# Patient Record
Sex: FEMALE | Race: WHITE | NOT HISPANIC OR LATINO | URBAN - METROPOLITAN AREA
[De-identification: names, ages, dates, MRNs, and addresses within clinical notes are randomized per-mention and may not be internally consistent; named-entity substitution may affect disease eponyms.]

---

## 2018-01-12 ENCOUNTER — OFFICE (OUTPATIENT)
Dept: URBAN - METROPOLITAN AREA CLINIC 33 | Facility: CLINIC | Age: 73
End: 2018-01-12
Payer: COMMERCIAL

## 2018-01-12 VITALS
HEIGHT: 67 IN | DIASTOLIC BLOOD PRESSURE: 92 MMHG | TEMPERATURE: 97 F | SYSTOLIC BLOOD PRESSURE: 120 MMHG | WEIGHT: 188 LBS | HEART RATE: 85 BPM

## 2018-01-12 DIAGNOSIS — R13.10 DYSPHAGIA, UNSPECIFIED: ICD-10-CM

## 2018-01-12 DIAGNOSIS — K21.9 GASTRO-ESOPHAGEAL REFLUX DISEASE WITHOUT ESOPHAGITIS: ICD-10-CM

## 2018-01-12 DIAGNOSIS — K59.00 CONSTIPATION, UNSPECIFIED: ICD-10-CM

## 2018-01-12 PROCEDURE — 99214 OFFICE O/P EST MOD 30 MIN: CPT

## 2018-01-12 RX ORDER — PANTOPRAZOLE SODIUM 40 MG/1
TABLET, DELAYED RELEASE ORAL
Qty: 39 | Refills: 11 | Status: COMPLETED
Start: 2018-01-12 | End: 2019-08-08

## 2018-01-12 NOTE — SERVICEHPINOTES
71 yo female presents with difficulty swallowing and a lot of heartburn. She notes tightness and pressure in lower substernal area when she eats or drinks. She has been on pantoprazole 40 mg for many years - was getting through her pulmonologist. She has been off of it since December and is now having symptoms. She had heartburn and dysphagia in past and had EGD here in 2016 with empiric dilation. EGD was normal other than irregular z-line - bx showed inflammation, negative for Mirza's. She reports high stress levels and is tearful during visit. Says her  is having health problems as well. No abdominal pain, N/V, black stools. She does struggle with chronic constipation. Uses Miralax about 4x/week but states it doesn't work very well. She has pacemaker. Has aortic regurgitation - echo from Oct 2017 reports moderate regurg and enlarged ascending aorta.

## 2018-07-13 ENCOUNTER — OFFICE (OUTPATIENT)
Dept: URBAN - METROPOLITAN AREA CLINIC 33 | Facility: CLINIC | Age: 73
End: 2018-07-13

## 2018-07-13 VITALS
SYSTOLIC BLOOD PRESSURE: 122 MMHG | HEIGHT: 67 IN | DIASTOLIC BLOOD PRESSURE: 83 MMHG | WEIGHT: 189 LBS | TEMPERATURE: 97.9 F | HEART RATE: 67 BPM

## 2018-07-13 DIAGNOSIS — R13.10 DYSPHAGIA, UNSPECIFIED: ICD-10-CM

## 2018-07-13 DIAGNOSIS — K59.09 OTHER CONSTIPATION: ICD-10-CM

## 2018-07-13 DIAGNOSIS — K21.9 GASTRO-ESOPHAGEAL REFLUX DISEASE WITHOUT ESOPHAGITIS: ICD-10-CM

## 2018-07-13 PROCEDURE — 99214 OFFICE O/P EST MOD 30 MIN: CPT

## 2018-07-13 RX ORDER — OMEPRAZOLE 40 MG/1
CAPSULE, DELAYED RELEASE ORAL
Qty: 30 | Refills: 12 | Status: COMPLETED
End: 2020-06-09

## 2018-07-13 NOTE — SERVICEHPINOTES
73 yo female presents for f/u GERD and dysphagia. She was here in January and had been having symptoms since being off her PPI. EGD was planned but she didn't end up getting this done. She was doing fairly well back on her pantoprazole but as of a few weeks ago has been having more acid reflux symptoms. Tends to be somewhat worse at night. Has started methylated/active B6-9-12 supplement (Metanx) for her neuropathy recently and wasn't sure if that might be causing worse GERD. She does also have seasonal allergies which have seemed worse recently as well - causes hoarse voice occasionally. She has a pulmonologist she sees. Still has occasional dysphagia, also had in past and had EGD here in 2016 with empiric dilation. EGD was normal other than irregular z-line - bx showed inflammation, negative for Mirza's. No abdominal pain, N/V, black stools. She does struggle with chronic constipation but this is currently better-controlled on Miralax 1-2x/day. She tried Trulance earlier this year and it worked well but is not covered by her insurance. Has not tried Amitiza or Linzess. Can have occasional bouts of looser stools.  She has pacemaker. Has aortic regurgitation - echo from Oct 2017 reports moderate regurg and enlarged ascending aorta. Cleared for EGD by cardiology earlier this year.

## 2018-09-06 ENCOUNTER — ON CAMPUS - OUTPATIENT (OUTPATIENT)
Dept: URBAN - METROPOLITAN AREA HOSPITAL 14 | Facility: HOSPITAL | Age: 73
End: 2018-09-06
Payer: COMMERCIAL

## 2018-09-06 DIAGNOSIS — K21.9 GASTRO-ESOPHAGEAL REFLUX DISEASE WITHOUT ESOPHAGITIS: ICD-10-CM

## 2018-09-06 DIAGNOSIS — R13.10 DYSPHAGIA, UNSPECIFIED: ICD-10-CM

## 2018-09-06 DIAGNOSIS — K31.7 POLYP OF STOMACH AND DUODENUM: ICD-10-CM

## 2018-09-06 PROCEDURE — 43239 EGD BIOPSY SINGLE/MULTIPLE: CPT

## 2018-09-06 PROCEDURE — 43450 DILATE ESOPHAGUS 1/MULT PASS: CPT

## 2019-08-08 ENCOUNTER — OFFICE (OUTPATIENT)
Dept: URBAN - METROPOLITAN AREA CLINIC 33 | Facility: CLINIC | Age: 74
End: 2019-08-08
Payer: COMMERCIAL

## 2019-08-08 VITALS
SYSTOLIC BLOOD PRESSURE: 130 MMHG | WEIGHT: 181 LBS | DIASTOLIC BLOOD PRESSURE: 73 MMHG | HEART RATE: 56 BPM | TEMPERATURE: 97 F | HEIGHT: 67 IN

## 2019-08-08 DIAGNOSIS — R10.11 RIGHT UPPER QUADRANT PAIN: ICD-10-CM

## 2019-08-08 DIAGNOSIS — R07.89 OTHER CHEST PAIN: ICD-10-CM

## 2019-08-08 DIAGNOSIS — K59.00 CONSTIPATION, UNSPECIFIED: ICD-10-CM

## 2019-08-08 DIAGNOSIS — K21.9 GASTRO-ESOPHAGEAL REFLUX DISEASE WITHOUT ESOPHAGITIS: ICD-10-CM

## 2019-08-08 PROCEDURE — 99214 OFFICE O/P EST MOD 30 MIN: CPT

## 2019-08-08 NOTE — SERVICEHPINOTES
SALENA CALDERÓN   is a   73  female who present with heartburn. As long as takes Omeprazole 40 mg, the acid reflux is controlled. Trouble swallowing on occasions with solids.  BRHad GI virus about 6 months ago, vomited black emesis for one day and had diarrhea. Think it was due to eating spoiled egg. BRDenies further nausea or vomiting since then. Occasional RUQ pain. Reports having chest pain, cardiac work up was stable. Seen by psychiatrists and prescribed clonazepam since 10 days ago. The chest pain seem to be improving. + Stress. Has been experiencing shakiness, thought to be due to low sugar. Takes protein drinks in between meals which seem to help.  Reports chronic constipation. Takes Miralax twice daily up to 4 times a week, still constipated. Amitiza worked well but so expensive can't afford.  Denies blood in stool or melena. Stool colors vary. Occasional pio colored stool. EGD 09/2018 showed moderate gastritis, 3 cm hiatal hernia  and irregular z line without evidence of Mirza's esophagus. Takes advil and Tylenol daily for back pain. Last colonoscopy 11/2012 unremarkable, advised to repeat it in 10 years.BR

## 2019-08-29 ENCOUNTER — OFFICE (OUTPATIENT)
Dept: URBAN - METROPOLITAN AREA CLINIC 33 | Facility: CLINIC | Age: 74
End: 2019-08-29

## 2019-08-29 VITALS
WEIGHT: 178 LBS | SYSTOLIC BLOOD PRESSURE: 125 MMHG | DIASTOLIC BLOOD PRESSURE: 98 MMHG | TEMPERATURE: 96.3 F | HEART RATE: 83 BPM | HEIGHT: 67 IN

## 2019-08-29 DIAGNOSIS — R07.89 OTHER CHEST PAIN: ICD-10-CM

## 2019-08-29 DIAGNOSIS — R10.32 LEFT LOWER QUADRANT PAIN: ICD-10-CM

## 2019-08-29 DIAGNOSIS — R49.0 DYSPHONIA: ICD-10-CM

## 2019-08-29 DIAGNOSIS — K21.9 GASTRO-ESOPHAGEAL REFLUX DISEASE WITHOUT ESOPHAGITIS: ICD-10-CM

## 2019-08-29 DIAGNOSIS — K44.9 DIAPHRAGMATIC HERNIA WITHOUT OBSTRUCTION OR GANGRENE: ICD-10-CM

## 2019-08-29 DIAGNOSIS — K59.00 CONSTIPATION, UNSPECIFIED: ICD-10-CM

## 2019-08-29 DIAGNOSIS — R13.10 DYSPHAGIA, UNSPECIFIED: ICD-10-CM

## 2019-08-29 PROCEDURE — 99214 OFFICE O/P EST MOD 30 MIN: CPT

## 2019-08-29 NOTE — SERVICEHPINOTES
SALENA CALDERÓN   is a   73  female who presents for follow up. Barium swallow on 08/22/19 showed moderate to large hiatal hernia and severe acid reflux. U/S of GI findings unremarkable. BRHas been taking omeprazole 40 mg once daily for long time. Acid reflux is controlled. BR+ Hoarseness, not sure if seasonal allergy. BRThe only symptoms that she has is the pain at lower sternum.  BRShe is tying to determine if this is from cardiac etiology, anxiety or hiatal hernia. BRDr. Toledo, cardiologist, cardiac workup was negative. BRPsychologists Dr. Bower increased clonazepam recently which has helped but still has the pain at lower sternum. Denies nausea or vomiting. Occasional trouble swallowing with solids. BRConstipation, chronic. Currently takes miralax 1 cap daily and senna, needs to buy more. BRAmitiza works well but can't afford it. BRNot sure if seen blood in stool. Seen reddish color in stool.

## 2020-06-09 ENCOUNTER — OFFICE (OUTPATIENT)
Dept: URBAN - METROPOLITAN AREA TELEHEALTH 7 | Facility: TELEHEALTH | Age: 75
End: 2020-06-09
Payer: COMMERCIAL

## 2020-06-09 VITALS — WEIGHT: 163 LBS | HEIGHT: 65 IN

## 2020-06-09 DIAGNOSIS — K59.09 OTHER CONSTIPATION: ICD-10-CM

## 2020-06-09 DIAGNOSIS — R63.4 ABNORMAL WEIGHT LOSS: ICD-10-CM

## 2020-06-09 DIAGNOSIS — R63.0 ANOREXIA: ICD-10-CM

## 2020-06-09 DIAGNOSIS — R53.1 WEAKNESS: ICD-10-CM

## 2020-06-09 DIAGNOSIS — R19.5 OTHER FECAL ABNORMALITIES: ICD-10-CM

## 2020-06-09 DIAGNOSIS — K44.9 DIAPHRAGMATIC HERNIA WITHOUT OBSTRUCTION OR GANGRENE: ICD-10-CM

## 2020-06-09 PROCEDURE — 99214 OFFICE O/P EST MOD 30 MIN: CPT | Mod: 95 | Performed by: INTERNAL MEDICINE

## 2020-06-09 NOTE — SERVICENOTES
Patient's visit was conducted through [facility] telecommunication. Patient consented before the start of visit as to understanding of privacy concerns, possible technological failure, and their responsibility of carrying out instructions of plan.

## 2020-06-09 NOTE — SERVICEHPINOTES
PATIENT VERIFIED BY DATE OF BIRTH AND NAME. Patient has been consented for this telecommunication visit.   Ms. Guadalupe is following up via televisit today to discuss her ongoing and worsening constipation.  She has had chronic constipation but it has worsened over the years.  She was not able to afford Linzess which she tried at some point and worked for her very well.  Instead, she is using Miralax 5-6 days out of the week, two times a day some of the days.  Plus she will use PRN suppositories as well as occasional Senna, which she hasn't used for the last two weeks as she ran out.  She also notes weakness and trembling.  She had labs with her PCP just a few days ago and everything came back "normal."  (I don't have these labs yet to review).  Additionally, she notes decreased appetite and 20 lb weight loss over the last several weeks.  She had one episode of nausea but no vomiting, abdominal or other pains, dysphagia or any upper GI problems since she'd had her hiatal hernia repair last November.                                                                                             She thought she saw bright red stools the other day, consistent with possible blood but she wasn't sure.  She has hemorrhoids.  Her last colonoscopy was in November of 2012 and was notable for internal hemorrhoids, otherwise OK.  She'd had a CT prior to this in September that showed sigmoid thickening, possible narrowing without obvious diverticulitis.  On colonoscopy then the sigmoid had normal appearance.   BRROS per HPI, otherwise negative.

## 2020-07-07 ENCOUNTER — ON CAMPUS - OUTPATIENT (OUTPATIENT)
Dept: URBAN - METROPOLITAN AREA HOSPITAL 16 | Facility: HOSPITAL | Age: 75
End: 2020-07-07
Payer: COMMERCIAL

## 2020-07-07 DIAGNOSIS — R63.4 ABNORMAL WEIGHT LOSS: ICD-10-CM

## 2020-07-07 DIAGNOSIS — R19.5 OTHER FECAL ABNORMALITIES: ICD-10-CM

## 2020-07-07 DIAGNOSIS — R63.0 ANOREXIA: ICD-10-CM

## 2020-07-07 DIAGNOSIS — K59.09 OTHER CONSTIPATION: ICD-10-CM

## 2020-07-07 PROCEDURE — 45380 COLONOSCOPY AND BIOPSY: CPT | Performed by: INTERNAL MEDICINE

## 2021-12-14 ENCOUNTER — OFFICE (OUTPATIENT)
Dept: URBAN - METROPOLITAN AREA TELEHEALTH 3 | Facility: TELEHEALTH | Age: 76
End: 2021-12-14
Payer: COMMERCIAL

## 2021-12-14 VITALS — HEIGHT: 65 IN | WEIGHT: 165 LBS

## 2021-12-14 DIAGNOSIS — G20 PARKINSON'S DISEASE: ICD-10-CM

## 2021-12-14 DIAGNOSIS — K59.09 OTHER CONSTIPATION: ICD-10-CM

## 2021-12-14 DIAGNOSIS — K21.9 GASTRO-ESOPHAGEAL REFLUX DISEASE WITHOUT ESOPHAGITIS: ICD-10-CM

## 2021-12-14 PROCEDURE — 99214 OFFICE O/P EST MOD 30 MIN: CPT | Mod: 95 | Performed by: INTERNAL MEDICINE

## 2021-12-14 RX ORDER — OMEPRAZOLE 20 MG/1
CAPSULE, DELAYED RELEASE ORAL
Qty: 90 | Refills: 3 | Status: COMPLETED
Start: 2021-12-14 | End: 2022-01-06

## 2021-12-14 NOTE — SERVICEHPINOTES
PATIENT VERIFIED BY DATE OF BIRTH AND NAME. Patient has been consented for this telecommunication visit.     Ms. Guadalupe has been diagnosed with Parkinson's this summer.  She is on carbidopa-levodopa and amantadine now.  She has minor tremors and balance issues working with physical therapy now.  BMs are improved.  She takes Senna-S 8.6 mg x 2 in evening and docusate 100 mg with it.  She takes Miralax four times a week.  On this regimen her bowels are still pretty hard, three times a week, with lots of exertion.   She had little success with Amitiza, couldn't afford Linzess in the past.  She wants to figure out how to best improve her regimen at this point.  She also wonders if the hemorrhoids she has may be the cause of the problem with constipation.  She may have a little blood when the BM is really hard but otherwise no real active problems with hemorrhoids.  She also wonders if she should continue omeprazole, as she has had no problems with heartburn or dysphagia.  ROS Per HPI, otherwise negative

## 2022-07-25 ENCOUNTER — NEW PATIENT (OUTPATIENT)
Dept: URBAN - METROPOLITAN AREA CLINIC 66 | Facility: CLINIC | Age: 77
End: 2022-07-25

## 2022-07-25 DIAGNOSIS — H02.403: ICD-10-CM

## 2022-07-25 DIAGNOSIS — G45.3: ICD-10-CM

## 2022-07-25 DIAGNOSIS — H43.813: ICD-10-CM

## 2022-07-25 DIAGNOSIS — H35.372: ICD-10-CM

## 2022-07-25 PROCEDURE — 92134 CPTRZ OPH DX IMG PST SGM RTA: CPT

## 2022-07-25 PROCEDURE — 92004 COMPRE OPH EXAM NEW PT 1/>: CPT

## 2022-07-25 PROCEDURE — 92201 OPSCPY EXTND RTA DRAW UNI/BI: CPT

## 2022-07-25 ASSESSMENT — VISUAL ACUITY
OD_CC: 20/20-1
OS_CC: 20/20-1

## 2022-07-25 ASSESSMENT — TONOMETRY
OD_IOP_MMHG: 13
OS_IOP_MMHG: 16

## 2025-07-10 ENCOUNTER — OFFICE (OUTPATIENT)
Dept: URBAN - METROPOLITAN AREA CLINIC 34 | Facility: CLINIC | Age: 80
End: 2025-07-10
Payer: MEDICARE

## 2025-07-10 VITALS
WEIGHT: 155 LBS | SYSTOLIC BLOOD PRESSURE: 120 MMHG | HEART RATE: 78 BPM | HEIGHT: 65 IN | TEMPERATURE: 97.9 F | DIASTOLIC BLOOD PRESSURE: 81 MMHG

## 2025-07-10 DIAGNOSIS — G20.A1 PARKINSON'S DISEASE WITHOUT DYSKINESIA, WITHOUT MENTION OF F: ICD-10-CM

## 2025-07-10 DIAGNOSIS — R13.10 DYSPHAGIA, UNSPECIFIED: ICD-10-CM

## 2025-07-10 DIAGNOSIS — K59.00 CONSTIPATION, UNSPECIFIED: ICD-10-CM

## 2025-07-10 DIAGNOSIS — Z95.0 PRESENCE OF CARDIAC PACEMAKER: ICD-10-CM

## 2025-07-10 PROCEDURE — 99205 OFFICE O/P NEW HI 60 MIN: CPT

## 2025-07-10 NOTE — SERVICEHPINOTES
SALENA CALDERÓN   is a   79   year old    female who is being seen in consultation at the request of   LARRY ACEVEDO   for chronic constipation, occasional dysphagia, and she is due for colonoscopy. She takes Miralax once daily and she also takes Colace daily, moves her bowel a few times a week on BSS type 3 with occasional straining.  
br The patient is accompanied by her . br
brThe patient denies blood in stool (mixed or on wiping), mucus in stool, diarrhea, fever, chills, night sweats, recent antibiotic use, or recent travel. Hx of dysphagia and it is on/off with certain food. No ER visits. br Hiatal hernia repair a few years ago. br
br The patient denies epigastric pain,  nausea, vomiting, early satiety, postprandial fullness, melena, loss of appetite, or unintentional weight loss. The patient has a pacemaker for the past twelve years an sees [facility]- Chang Massey she does not take anticoagulants. In the last two moths she takes NSAIDs 800 mg once daily, she had a fall in 5/2025 and injured left shoulder blade, going to PT.
br 
br There is no family history of colorectal cancer.
br She does not drink alcohol and does not smoke. 
br She has hx of Parkinson br
brColonoscopy on: 7/7/2020 - hyperplastic polyp, TA. recall 5 yrs. br